# Patient Record
Sex: FEMALE | Race: BLACK OR AFRICAN AMERICAN | NOT HISPANIC OR LATINO | Employment: UNEMPLOYED | URBAN - METROPOLITAN AREA
[De-identification: names, ages, dates, MRNs, and addresses within clinical notes are randomized per-mention and may not be internally consistent; named-entity substitution may affect disease eponyms.]

---

## 2018-01-05 ENCOUNTER — OFFICE VISIT (OUTPATIENT)
Dept: PRIMARY CARE CLINIC | Facility: CLINIC | Age: 33
End: 2018-01-05
Payer: MEDICAID

## 2018-01-05 VITALS
SYSTOLIC BLOOD PRESSURE: 120 MMHG | WEIGHT: 223 LBS | TEMPERATURE: 98 F | HEIGHT: 67 IN | HEART RATE: 62 BPM | BODY MASS INDEX: 35 KG/M2 | DIASTOLIC BLOOD PRESSURE: 77 MMHG | RESPIRATION RATE: 18 BRPM | OXYGEN SATURATION: 99 %

## 2018-01-05 DIAGNOSIS — J32.9 SINUSITIS, UNSPECIFIED CHRONICITY, UNSPECIFIED LOCATION: Primary | ICD-10-CM

## 2018-01-05 DIAGNOSIS — J40 BRONCHITIS: ICD-10-CM

## 2018-01-05 DIAGNOSIS — R51.9 NONINTRACTABLE HEADACHE, UNSPECIFIED CHRONICITY PATTERN, UNSPECIFIED HEADACHE TYPE: ICD-10-CM

## 2018-01-05 DIAGNOSIS — E66.9 OBESITY, UNSPECIFIED CLASSIFICATION, UNSPECIFIED OBESITY TYPE, UNSPECIFIED WHETHER SERIOUS COMORBIDITY PRESENT: ICD-10-CM

## 2018-01-05 PROCEDURE — 99202 OFFICE O/P NEW SF 15 MIN: CPT | Mod: S$PBB,,, | Performed by: FAMILY MEDICINE

## 2018-01-05 PROCEDURE — 99204 OFFICE O/P NEW MOD 45 MIN: CPT | Mod: PBBFAC,PN | Performed by: FAMILY MEDICINE

## 2018-01-05 PROCEDURE — 99999 PR PBB SHADOW E&M-NEW PATIENT-LVL IV: CPT | Mod: PBBFAC,,, | Performed by: FAMILY MEDICINE

## 2018-01-05 RX ORDER — METHYLPREDNISOLONE 4 MG/1
TABLET ORAL
Qty: 1 PACKAGE | Refills: 0 | Status: SHIPPED | OUTPATIENT
Start: 2018-01-05 | End: 2018-01-26

## 2018-01-05 RX ORDER — PROMETHAZINE HYDROCHLORIDE AND CODEINE PHOSPHATE 6.25; 1 MG/5ML; MG/5ML
5 SOLUTION ORAL EVERY 6 HOURS PRN
Qty: 180 ML | Refills: 0 | Status: SHIPPED | OUTPATIENT
Start: 2018-01-05 | End: 2018-05-21

## 2018-01-05 RX ORDER — AZITHROMYCIN 250 MG/1
TABLET, FILM COATED ORAL
Qty: 6 TABLET | Refills: 0 | Status: SHIPPED | OUTPATIENT
Start: 2018-01-05 | End: 2018-01-09

## 2018-01-05 NOTE — PROGRESS NOTES
Subjective:       Patient ID: Levi Treviño is a 32 y.o. female.    Chief Complaint: Migraine and Cough (Congestion)    HPI: 42-year-old black female in for cold-sick×3 days--- no fever, + stuffiness nose.  No sore throat.  Bad cough, + phlegm-green.  No pneumonia asthma TB no smoking  Headache in the frontal area--just since a cold    ROS:  Skin: no psoriasis, eczema, skin cancer  HEENT: + headache,no  ocular pain, blurred vision, diplopia, epistaxis, hoarseness change in voice, thyroid trouble  Lung: No pneumonia, asthma, Tb, wheezing, SOB,  Heart: No chest pain, ankle edema, palpitations, MI, william murmur, hypertension, hyperlipidemia  Abdomen: No nausea, vomiting, diarrhea, constipation, ulcers, hepatitis, gallbladder disease, melena, hematochezia, hematemesis  : no UTI, renal disease, stones  GYN no hysterectomy--just Birth control shot today   MS: no fractures, O/A, lupus, rheumatoid, gout  Neuro: No dizziness, LOC, seizures   No diabetes, no anemia, no anxiety, no depression     Objective:   Physical Exam:  General: Well nourished, well developed, no acute distress  Skin: No lesions  HEENT: Eyes PERRLA, EOM intact, nose clear discharge, throat + erythema 1/4, ears TM clear good light reflex no bulging retraction  NECK: Supple, no bruits, No JVD, no nodes  Lungs: Clear, no rales, rhonchi, wheezing + coarse cough  Heart: Regular rate and rhythm, no murmurs, gallops, or rubs  Abdomen: flat, bowel sounds positive, no tenderness, or organomegaly  MS: Range of motion and muscle strength intact  Neuro: Alert, CN intact, oriented X 3  Extremities: No cyanosis, clubbing, or edema         Assessment:       1. Sinusitis, unspecified chronicity, unspecified location    2. Bronchitis    3. Obesity, unspecified classification, unspecified obesity type, unspecified whether serious comorbidity present    4. Nonintractable headache, unspecified chronicity pattern, unspecified headache type        Plan:        Sinusitis, unspecified chronicity, unspecified location    Bronchitis    Obesity, unspecified classification, unspecified obesity type, unspecified whether serious comorbidity present  -     CBC auto differential; Future; Expected date: 01/05/2018  -     Comprehensive metabolic panel; Future; Expected date: 01/05/2018  -     Lipid panel; Future; Expected date: 01/05/2018  -     X-Ray Chest PA And Lateral; Future; Expected date: 01/05/2018  -     Urinalysis  -     EKG 12-lead; Future  -     TSH; Future; Expected date: 01/05/2018    Nonintractable headache, unspecified chronicity pattern, unspecified headache type    Other orders  -     azithromycin (Z-EMANI) 250 MG tablet; 2 tabs by mouth day 1, then 1 tab by mouth daily x 4 days  Dispense: 6 tablet; Refill: 0  -     promethazine-codeine 6.25-10 mg/5 ml (PHENERGAN WITH CODEINE) 6.25-10 mg/5 mL syrup; Take 5 mLs by mouth every 6 (six) hours as needed for Cough.  Dispense: 180 mL; Refill: 0  -     methylPREDNISolone (MEDROL DOSEPACK) 4 mg tablet; use as directed  Dispense: 1 Package; Refill: 0      routine labs placed in chart of patient ever desires to get a routine physical  If not better in 1 week can add Levaquin///

## 2018-01-13 PROBLEM — R51.9 NONINTRACTABLE HEADACHE: Status: ACTIVE | Noted: 2018-01-13

## 2018-01-13 PROBLEM — J40 BRONCHITIS: Status: ACTIVE | Noted: 2018-01-13

## 2018-05-21 ENCOUNTER — OFFICE VISIT (OUTPATIENT)
Dept: PRIMARY CARE CLINIC | Facility: CLINIC | Age: 33
End: 2018-05-21
Payer: MEDICAID

## 2018-05-21 VITALS
OXYGEN SATURATION: 100 % | WEIGHT: 227 LBS | HEIGHT: 67 IN | DIASTOLIC BLOOD PRESSURE: 69 MMHG | SYSTOLIC BLOOD PRESSURE: 102 MMHG | RESPIRATION RATE: 18 BRPM | HEART RATE: 68 BPM | BODY MASS INDEX: 35.63 KG/M2 | TEMPERATURE: 99 F

## 2018-05-21 DIAGNOSIS — J40 BRONCHITIS: Primary | ICD-10-CM

## 2018-05-21 DIAGNOSIS — E66.9 OBESITY, UNSPECIFIED CLASSIFICATION, UNSPECIFIED OBESITY TYPE, UNSPECIFIED WHETHER SERIOUS COMORBIDITY PRESENT: ICD-10-CM

## 2018-05-21 DIAGNOSIS — M62.9 MUSCULOSKELETAL DISORDER INVOLVING UPPER TRAPEZIUS MUSCLE: ICD-10-CM

## 2018-05-21 PROCEDURE — 99213 OFFICE O/P EST LOW 20 MIN: CPT | Mod: PBBFAC,PN | Performed by: FAMILY MEDICINE

## 2018-05-21 PROCEDURE — 99999 PR PBB SHADOW E&M-EST. PATIENT-LVL III: CPT | Mod: PBBFAC,,, | Performed by: FAMILY MEDICINE

## 2018-05-21 PROCEDURE — 99213 OFFICE O/P EST LOW 20 MIN: CPT | Mod: S$PBB,,, | Performed by: FAMILY MEDICINE

## 2018-05-21 RX ORDER — AZITHROMYCIN 250 MG/1
TABLET, FILM COATED ORAL
Qty: 6 TABLET | Refills: 0 | Status: SHIPPED | OUTPATIENT
Start: 2018-05-21 | End: 2018-05-25

## 2018-05-21 RX ORDER — IBUPROFEN 600 MG/1
600 TABLET ORAL 3 TIMES DAILY
Qty: 60 TABLET | Refills: 5 | Status: SHIPPED | OUTPATIENT
Start: 2018-05-21 | End: 2018-09-05

## 2018-05-21 RX ORDER — PROMETHAZINE HYDROCHLORIDE AND CODEINE PHOSPHATE 6.25; 1 MG/5ML; MG/5ML
5 SOLUTION ORAL EVERY 6 HOURS PRN
Qty: 180 ML | Refills: 0 | Status: SHIPPED | OUTPATIENT
Start: 2018-05-21 | End: 2018-09-05

## 2018-05-21 RX ORDER — CYCLOBENZAPRINE HCL 10 MG
10 TABLET ORAL 3 TIMES DAILY PRN
Qty: 30 TABLET | Refills: 5 | Status: SHIPPED | OUTPATIENT
Start: 2018-05-21 | End: 2018-05-31

## 2018-05-21 RX ORDER — METHYLPREDNISOLONE 4 MG/1
TABLET ORAL
Qty: 1 PACKAGE | Refills: 0 | Status: SHIPPED | OUTPATIENT
Start: 2018-05-21 | End: 2018-06-11

## 2018-05-21 NOTE — PROGRESS NOTES
Subjective:       Patient ID: Levi Treviño is a 33 y.o. female.    Chief Complaint: Neck Pain (puilled muscle )    HPI: 33-year-old female in for neck pain thinks may have pulled a muscle--started last week.  Patient was stretching or may have recurred while holding a baby--- pain in the right upper trapezius.  No prior neck or shoulder injury.  No lupus rheumatoid gout       At night when patient lays down has a cold and has some difficulty breathing--no fever, no runny nose        , no sore throat, + cough only at night    ROS:  Skin: no psoriasis, eczema, skin cancer  HEENT: No headache, ocular pain, blurred vision, diplopia, epistaxis, hoarseness change in voice, thyroid trouble  Lung: No pneumonia, asthma, Tb, wheezing, SOB, no smoking  Heart: No chest pain, ankle edema, palpitations, MI, william murmur, hypertension, hyperlipidemia  Abdomen: No nausea, vomiting, diarrhea, constipation, ulcers, hepatitis, gallbladder disease, melena, hematochezia, hematemesis  : no UTI, renal disease, stones  GYN last menstrual period 5 years ago--on birth control  MS: no fractures, O/A, lupus, rheumatoid, gout  Neuro: No dizziness, LOC, seizures   No diabetes, no anemia, no anxiety, no depression     Objective:   Physical Exam:  General: Well nourished, well developed, no acute distress + overweight   Skin: No lesions  HEENT: Eyes PERRLA, EOM intact, nose patent, throat non-erythematous ears TM clear  NECK: Supple, no bruits, No JVD, no nodes  Lungs: Clear, no rales, rhonchi, wheezing + coarse cough  Heart: Regular rate and rhythm, no murmurs, gallops, or rubs  Abdomen: flat, bowel sounds positive, no tenderness, or organomegaly  MS: Tenderness right upper trapezius pain with palpation pain with raising arms overhead able to go up to about 150° with abduction but unable to get completely overhead due to pain in the right upper trapezius anterior posterior flexion shoulder is intact full range of motion muscle strength  and neck some pain with lateral flexion and rotation neck to the left in the right upper trapezius muscle muscle strength in the hand arms are all intact  Neuro: Alert, CN intact, oriented X 3  Extremities: No cyanosis, clubbing, or edema         Assessment:       1. Bronchitis    2. Obesity, unspecified classification, unspecified obesity type, unspecified whether serious comorbidity present    3. Musculoskeletal disorder involving upper trapezius muscle        Plan:       Bronchitis    Obesity, unspecified classification, unspecified obesity type, unspecified whether serious comorbidity present    Musculoskeletal disorder involving upper trapezius muscle    Other orders  -     azithromycin (Z-EMANI) 250 MG tablet; 2 tabs by mouth day 1, then 1 tab by mouth daily x 4 days  Dispense: 6 tablet; Refill: 0  -     promethazine-codeine 6.25-10 mg/5 ml (PHENERGAN WITH CODEINE) 6.25-10 mg/5 mL syrup; Take 5 mLs by mouth every 6 (six) hours as needed for Cough.  Dispense: 180 mL; Refill: 0  -     methylPREDNISolone (MEDROL DOSEPACK) 4 mg tablet; use as directed  Dispense: 1 Package; Refill: 0  -     ibuprofen (ADVIL,MOTRIN) 600 MG tablet; Take 1 tablet (600 mg total) by mouth 3 (three) times daily.  Dispense: 60 tablet; Refill: 5  -     cyclobenzaprine (FLEXERIL) 10 MG tablet; Take 1 tablet (10 mg total) by mouth 3 (three) times daily as needed.  Dispense: 30 tablet; Refill: 5      moist heat/Theragesic/range of motion exercise  For cold Zithromax/Medrol/Phenergan With Codeine coughing spells especially at night and occasional wheeze  For right shoulder pain after Medrol complete start ibuprofen 600 every 6-8 hours Flexeril 1 by mouth daily at bedtime  If not better in 6 weeks do x-ray of the cervical spine and right shoulder  Patient needs to do physical once off of steroids for 2 weeks as in computer CBC CMP lipid T4 TSH UA chest x-ray EKG is of physical

## 2018-09-05 ENCOUNTER — OFFICE VISIT (OUTPATIENT)
Dept: PRIMARY CARE CLINIC | Facility: CLINIC | Age: 33
End: 2018-09-05
Payer: MEDICAID

## 2018-09-05 VITALS
BODY MASS INDEX: 35.94 KG/M2 | HEIGHT: 67 IN | DIASTOLIC BLOOD PRESSURE: 65 MMHG | WEIGHT: 229 LBS | TEMPERATURE: 98 F | HEART RATE: 74 BPM | SYSTOLIC BLOOD PRESSURE: 99 MMHG | RESPIRATION RATE: 18 BRPM | OXYGEN SATURATION: 100 %

## 2018-09-05 DIAGNOSIS — K02.9 DENTAL CARIES: Primary | ICD-10-CM

## 2018-09-05 DIAGNOSIS — E66.9 OBESITY, UNSPECIFIED CLASSIFICATION, UNSPECIFIED OBESITY TYPE, UNSPECIFIED WHETHER SERIOUS COMORBIDITY PRESENT: ICD-10-CM

## 2018-09-05 DIAGNOSIS — J32.9 SINUSITIS, UNSPECIFIED CHRONICITY, UNSPECIFIED LOCATION: ICD-10-CM

## 2018-09-05 DIAGNOSIS — J40 BRONCHITIS: ICD-10-CM

## 2018-09-05 PROCEDURE — 99213 OFFICE O/P EST LOW 20 MIN: CPT | Mod: PBBFAC,PN | Performed by: FAMILY MEDICINE

## 2018-09-05 PROCEDURE — 99213 OFFICE O/P EST LOW 20 MIN: CPT | Mod: S$PBB,,, | Performed by: FAMILY MEDICINE

## 2018-09-05 PROCEDURE — 99999 PR PBB SHADOW E&M-EST. PATIENT-LVL III: CPT | Mod: PBBFAC,,, | Performed by: FAMILY MEDICINE

## 2018-09-05 RX ORDER — METHYLPREDNISOLONE 4 MG/1
TABLET ORAL
Qty: 1 PACKAGE | Refills: 0 | Status: SHIPPED | OUTPATIENT
Start: 2018-09-05 | End: 2018-11-01

## 2018-09-05 RX ORDER — PROMETHAZINE HYDROCHLORIDE AND CODEINE PHOSPHATE 6.25; 1 MG/5ML; MG/5ML
5 SOLUTION ORAL EVERY 6 HOURS PRN
Qty: 180 ML | Refills: 0 | Status: SHIPPED | OUTPATIENT
Start: 2018-09-05 | End: 2018-11-01

## 2018-09-05 RX ORDER — AMOXICILLIN AND CLAVULANATE POTASSIUM 875; 125 MG/1; MG/1
1 TABLET, FILM COATED ORAL EVERY 12 HOURS
Qty: 20 TABLET | Refills: 0 | Status: SHIPPED | OUTPATIENT
Start: 2018-09-05 | End: 2018-09-15

## 2018-09-05 NOTE — PROGRESS NOTES
Subjective:       Patient ID: Levi Treviño is a 33 y.o. female.    Chief Complaint: Otalgia (sore throat )    HPI: 33-year-old female in for ear pain especially the right-- suffering since last Friday-- 5 days-- no fever, +runny stuffy nose, +sore throat, +cough, +phlegm-- green.  No pneumonia asthma TB. No smoke    ROS:  Skin: no psoriasis, eczema, skin cancer  HEENT: No headache, ocular pain, blurred vision, diplopia, epistaxis, hoarseness change in voice, thyroid trouble  Lung: No pneumonia, asthma, Tb, wheezing, SOB, no smoking  Heart: No chest pain, ankle edema, palpitations, MI, william murmur, hypertension, hyperlipidemia  Abdomen: No nausea, vomiting, diarrhea, constipation, ulcers, hepatitis, gallbladder disease, melena, hematochezia, hematemesis  : no UTI, renal disease, stones  Gyn LMP  On Depo Provera   MS: no fractures, O/A, lupus, rheumatoid, gout  Neuro: No dizziness, LOC, seizures    No diabetes, no anemia, no anxiety, no depression   single, 5 children, unemployed,  Lives with 4 children.     Objective:   Physical Exam:  General: Well nourished, well developed, no acute distress  +overweight  Skin: No lesions  HEENT: Eyes PERRLA, EOM intact, nose  Clear discharge, throat +erythematous  1/4 ears TMs clear several severe dental caries  NECK: Supple, no bruits, No JVD, no nodes  Lungs: Clear, no rales, rhonchi, wheezing +coarse cough  Heart: Regular rate and rhythm, no murmurs, gallops, or rubs  Abdomen: flat, bowel sounds positive, no tenderness, or organomegaly  MS: Range of motion and muscle strength intact  Neuro: Alert, CN intact, oriented X 3  Extremities: No cyanosis, clubbing, or edema         Assessment:       No diagnosis found.    Plan:       There are no diagnoses linked to this encounter.

## 2018-12-11 ENCOUNTER — TELEPHONE (OUTPATIENT)
Dept: PRIMARY CARE CLINIC | Facility: CLINIC | Age: 33
End: 2018-12-11

## 2018-12-11 NOTE — TELEPHONE ENCOUNTER
----- Message from Saray Jorgensen sent at 12/11/2018  9:08 AM CST -----   Type:  Same Day Appointment Request    Caller is requesting a same day appointment.  Caller declined first available appointment listed below.      Name of Caller: pt  When is the first available appointment? tomorrow  Symptoms:   headache   cold  Best Call Back Number:   [phone]  Additional Information:     Wants to  Be  Seen  Today

## 2018-12-12 ENCOUNTER — OFFICE VISIT (OUTPATIENT)
Dept: PRIMARY CARE CLINIC | Facility: CLINIC | Age: 33
End: 2018-12-12
Payer: MEDICAID

## 2018-12-12 VITALS
WEIGHT: 229 LBS | TEMPERATURE: 98 F | BODY MASS INDEX: 34.71 KG/M2 | RESPIRATION RATE: 18 BRPM | OXYGEN SATURATION: 99 % | HEART RATE: 76 BPM | DIASTOLIC BLOOD PRESSURE: 73 MMHG | HEIGHT: 68 IN | SYSTOLIC BLOOD PRESSURE: 117 MMHG

## 2018-12-12 DIAGNOSIS — J32.9 SINUSITIS, UNSPECIFIED CHRONICITY, UNSPECIFIED LOCATION: Primary | ICD-10-CM

## 2018-12-12 DIAGNOSIS — K02.9 DENTAL CARIES: ICD-10-CM

## 2018-12-12 DIAGNOSIS — E66.9 OBESITY, UNSPECIFIED CLASSIFICATION, UNSPECIFIED OBESITY TYPE, UNSPECIFIED WHETHER SERIOUS COMORBIDITY PRESENT: ICD-10-CM

## 2018-12-12 DIAGNOSIS — J40 BRONCHITIS: ICD-10-CM

## 2018-12-12 DIAGNOSIS — R51.9 NONINTRACTABLE HEADACHE, UNSPECIFIED CHRONICITY PATTERN, UNSPECIFIED HEADACHE TYPE: ICD-10-CM

## 2018-12-12 PROCEDURE — 99214 OFFICE O/P EST MOD 30 MIN: CPT | Mod: PBBFAC,PN | Performed by: FAMILY MEDICINE

## 2018-12-12 PROCEDURE — 99999 PR PBB SHADOW E&M-EST. PATIENT-LVL IV: CPT | Mod: PBBFAC,,, | Performed by: FAMILY MEDICINE

## 2018-12-12 PROCEDURE — 96372 THER/PROPH/DIAG INJ SC/IM: CPT | Mod: PBBFAC,PN

## 2018-12-12 PROCEDURE — 99214 OFFICE O/P EST MOD 30 MIN: CPT | Mod: S$PBB,,, | Performed by: FAMILY MEDICINE

## 2018-12-12 RX ORDER — BUTALBITAL, ACETAMINOPHEN AND CAFFEINE 50; 325; 40 MG/1; MG/1; MG/1
TABLET ORAL
Qty: 30 TABLET | Refills: 1 | Status: SHIPPED | OUTPATIENT
Start: 2018-12-12 | End: 2019-02-22 | Stop reason: ALTCHOICE

## 2018-12-12 RX ORDER — METHYLPREDNISOLONE 4 MG/1
TABLET ORAL
Qty: 1 PACKAGE | Refills: 0 | Status: SHIPPED | OUTPATIENT
Start: 2018-12-12 | End: 2019-02-22 | Stop reason: ALTCHOICE

## 2018-12-12 RX ORDER — PROMETHAZINE HYDROCHLORIDE AND CODEINE PHOSPHATE 6.25; 1 MG/5ML; MG/5ML
5 SOLUTION ORAL EVERY 6 HOURS PRN
Qty: 180 ML | Refills: 0 | Status: SHIPPED | OUTPATIENT
Start: 2018-12-12 | End: 2019-02-22 | Stop reason: ALTCHOICE

## 2018-12-12 RX ORDER — BETAMETHASONE SODIUM PHOSPHATE AND BETAMETHASONE ACETATE 3; 3 MG/ML; MG/ML
12 INJECTION, SUSPENSION INTRA-ARTICULAR; INTRALESIONAL; INTRAMUSCULAR; SOFT TISSUE
Status: COMPLETED | OUTPATIENT
Start: 2018-12-12 | End: 2018-12-12

## 2018-12-12 RX ORDER — IBUPROFEN 600 MG/1
600 TABLET ORAL EVERY 6 HOURS PRN
Qty: 100 TABLET | Refills: 5 | Status: SHIPPED | OUTPATIENT
Start: 2018-12-12 | End: 2019-07-23

## 2018-12-12 RX ORDER — AZITHROMYCIN 250 MG/1
TABLET, FILM COATED ORAL
Qty: 6 TABLET | Refills: 0 | Status: SHIPPED | OUTPATIENT
Start: 2018-12-12 | End: 2018-12-16

## 2018-12-12 RX ADMIN — BETAMETHASONE SODIUM PHOSPHATE AND BETAMETHASONE ACETATE 12 MG: 3; 3 INJECTION, SUSPENSION INTRA-ARTICULAR; INTRALESIONAL; INTRAMUSCULAR at 03:12

## 2018-12-12 NOTE — PROGRESS NOTES
Subjective:       Patient ID: Levi Treviño is a 33 y.o. female.    Chief Complaint: Cough (congestion) and Generalized Body Aches    HPI: 33-year-old female --sick time 2-3 days--children are sick--no fever, +runny nose, no sore throat, +cough-bad, +phlegm-cream.  No pneumonia asthma TB.  No smokin body aches, headache       Patient states son and patient gets migraine headache--time 1 year--headache in the temples bilaterally--frequency 4 times per month--quality--states just hurting in the temple area--severity 8/10--duration 1-2 days.  No blurred vision pain eyeballs, double vision  nose bleeds dizziness passing out or seizures    ROS:  Skin: no psoriasis, eczema, skin cancer  HEENT: No headache, ocular pain, blurred vision, diplopia, epistaxis, hoarseness change in voice, thyroid trouble  Lung: No pneumonia, asthma, Tb, wheezing, SOB, no smoking  Heart: No chest pain, ankle edema, palpitations, MI, william murmur, hypertension, hyperlipidemia  Abdomen: No nausea, vomiting, diarrhea, constipation, ulcers, hepatitis, gallbladder disease, melena, hematochezia, hematemesis  : no UTI, renal disease, stones  Gyn LMP  On Depo Provera   MS: no fractures, O/A, lupus, rheumatoid, gout  Neuro: No dizziness, LOC, seizures    No diabetes, no anemia, no anxiety, + depression- since Friday --no help with daughter on the soccer team.  No transportation for her so she was all upset   single, 5 children, unemployed,  Lives with 4 children.     Objective:   Physical Exam:  General: Well nourished, well developed, no acute distress  +overweight  Skin: No lesions  HEENT: Eyes PERRLA, EOM intact, nose  Clear discharge, throat +erythematous  1/4 ears TMs clear several severe dental caries  NECK: Supple, no bruits, No JVD, no nodes  Lungs: Clear, no rales, rhonchi, wheezing +coarse cough  Heart: Regular rate and rhythm, no murmurs, gallops, or rubs  Abdomen: flat, bowel sounds positive, no tenderness, or organomegaly  MS: Range  of motion and muscle strength intact reflexes +1/4   Neuro: Alert, CN intact, oriented X 3 Romberg negative heel-toe intact  Extremities: No cyanosis, clubbing, or edema         Assessment:       1. Sinusitis, unspecified chronicity, unspecified location    2. Bronchitis    3. Obesity, unspecified classification, unspecified obesity type, unspecified whether serious comorbidity present    4. Dental caries    5. Nonintractable headache, unspecified chronicity pattern, unspecified headache type        Plan:       Sinusitis, unspecified chronicity, unspecified location    Bronchitis    Obesity, unspecified classification, unspecified obesity type, unspecified whether serious comorbidity present    Dental caries    Nonintractable headache, unspecified chronicity pattern, unspecified headache type  -     CBC auto differential; Future; Expected date: 12/12/2018  -     Comprehensive metabolic panel; Future; Expected date: 12/12/2018  -     EKG 12-lead; Future  -     Fecal Immunochemical Test (iFOBT); Future; Expected date: 12/12/2018  -     Hemoglobin A1c; Future; Expected date: 12/12/2018  -     Lipid panel; Future; Expected date: 12/12/2018  -     X-Ray Chest PA And Lateral; Future; Expected date: 12/12/2018  -     POCT urine dipstick without microscope  -     T4, free; Future; Expected date: 12/12/2018  -     TSH; Future; Expected date: 12/12/2018  -     Ambulatory Referral to Ophthalmology    Other orders  -     betamethasone acetate-betamethasone sodium phosphate injection 12 mg  -     azithromycin (Z-EMANI) 250 MG tablet; 2 tabs by mouth day 1, then 1 tab by mouth daily x 4 days  Dispense: 6 tablet; Refill: 0  -     promethazine-codeine 6.25-10 mg/5 ml (PHENERGAN WITH CODEINE) 6.25-10 mg/5 mL syrup; Take 5 mLs by mouth every 6 (six) hours as needed for Cough.  Dispense: 180 mL; Refill: 0  -     methylPREDNISolone (MEDROL DOSEPACK) 4 mg tablet; use as directed  Dispense: 1 Package; Refill: 0  -      butalbital-acetaminophen-caffeine -40 mg (FIORICET, ESGIC) -40 mg per tablet; One p.o. q.d. p.r.n. headache try only after trying ibuprofen 600 first  Dispense: 30 tablet; Refill: 1  -     ibuprofen (ADVIL,MOTRIN) 600 MG tablet; Take 1 tablet (600 mg total) by mouth every 6 (six) hours as needed.  Dispense: 100 tablet; Refill: 5      Zithromax/Medrol/Phenergan with codeine/Celestone --for cold  Needs routine lab CBCs CMP lipid T4 TSH UA stool guaiac chest x-ray EKG--if all negative do sed rate CARLITA rheumatoid factor RPR TB skin tests HIV for completeness for headache workup  Needs to go to the eye doctor get vision checked due to headaches  Fioricet for headaches after trying ibuprofen 600 mg q.6 hours  If above tests negative will consider MRI of the brain without gadolinium and possible Neuro consult also may consider Imitrex Inderal

## 2018-12-12 NOTE — PROGRESS NOTES
Patient ID verified by name and . NKDA. Celestone 12 mg IM injection given in right ventrogluteal using aseptic technique. Aspirated with no blood noted. Patient tolerated well. Given per physicians order. No adverse reaction noted.

## 2019-02-22 ENCOUNTER — OFFICE VISIT (OUTPATIENT)
Dept: PRIMARY CARE CLINIC | Facility: CLINIC | Age: 34
End: 2019-02-22
Payer: MEDICAID

## 2019-02-22 VITALS
BODY MASS INDEX: 34.83 KG/M2 | TEMPERATURE: 99 F | RESPIRATION RATE: 18 BRPM | DIASTOLIC BLOOD PRESSURE: 86 MMHG | WEIGHT: 229.81 LBS | HEART RATE: 80 BPM | HEIGHT: 68 IN | OXYGEN SATURATION: 96 % | SYSTOLIC BLOOD PRESSURE: 119 MMHG

## 2019-02-22 DIAGNOSIS — J40 BRONCHITIS: Primary | ICD-10-CM

## 2019-02-22 PROCEDURE — 99214 PR OFFICE/OUTPT VISIT, EST, LEVL IV, 30-39 MIN: ICD-10-PCS | Mod: S$PBB,,, | Performed by: NURSE PRACTITIONER

## 2019-02-22 PROCEDURE — 99999 PR PBB SHADOW E&M-EST. PATIENT-LVL IV: CPT | Mod: PBBFAC,,, | Performed by: NURSE PRACTITIONER

## 2019-02-22 PROCEDURE — 99214 OFFICE O/P EST MOD 30 MIN: CPT | Mod: PBBFAC,PN | Performed by: NURSE PRACTITIONER

## 2019-02-22 PROCEDURE — 99999 PR PBB SHADOW E&M-EST. PATIENT-LVL IV: ICD-10-PCS | Mod: PBBFAC,,, | Performed by: NURSE PRACTITIONER

## 2019-02-22 PROCEDURE — 99214 OFFICE O/P EST MOD 30 MIN: CPT | Mod: S$PBB,,, | Performed by: NURSE PRACTITIONER

## 2019-02-22 RX ORDER — AZITHROMYCIN 250 MG/1
TABLET, FILM COATED ORAL
Qty: 6 TABLET | Refills: 0 | Status: SHIPPED | OUTPATIENT
Start: 2019-02-22 | End: 2019-02-22 | Stop reason: SDUPTHER

## 2019-02-22 RX ORDER — AZITHROMYCIN 250 MG/1
TABLET, FILM COATED ORAL
Qty: 6 TABLET | Refills: 0 | Status: SHIPPED | OUTPATIENT
Start: 2019-02-22 | End: 2019-02-27

## 2019-02-22 RX ORDER — PROMETHAZINE HYDROCHLORIDE AND CODEINE PHOSPHATE 6.25; 1 MG/5ML; MG/5ML
5 SOLUTION ORAL EVERY 6 HOURS PRN
Qty: 118 ML | Refills: 0 | Status: SHIPPED | OUTPATIENT
Start: 2019-02-22 | End: 2019-07-23

## 2019-02-22 NOTE — PROGRESS NOTES
Chief Complaint  Chief Complaint   Patient presents with    URI       HPI    Marlynclaudiophillip Treviño is a 33 y.o. female that presents for cough.    Reports the onset of symptoms approximately 7 days ago.  States that it started as an upper respiratory infection but has gradually progressed to a green, productive cough.  No associated wheezing or shortness of breath.  Patient is nonsmoker.  No fever or chills.  No sore throat.  No nausea vomiting diarrhea.  No history of asthma.  Patient states that she has taken some of her signs cough medicine that is helped is asking for refill at this time.      PAST MEDICAL HISTORY:  History reviewed. No pertinent past medical history.    PAST SURGICAL HISTORY:  History reviewed. No pertinent surgical history.    SOCIAL HISTORY:  Social History     Socioeconomic History    Marital status: Single     Spouse name: Not on file    Number of children: Not on file    Years of education: Not on file    Highest education level: Not on file   Social Needs    Financial resource strain: Not on file    Food insecurity - worry: Not on file    Food insecurity - inability: Not on file    Transportation needs - medical: Not on file    Transportation needs - non-medical: Not on file   Occupational History    Not on file   Tobacco Use    Smoking status: Never Smoker    Smokeless tobacco: Never Used   Substance and Sexual Activity    Alcohol use: No    Drug use: No    Sexual activity: Yes   Other Topics Concern    Not on file   Social History Narrative    Not on file       FAMILY HISTORY:  History reviewed. No pertinent family history.    ALLERGIES AND MEDICATIONS: updated and reviewed.  Review of patient's allergies indicates:  No Known Allergies  Current Outpatient Medications   Medication Sig Dispense Refill    estradiol cypionate (DEPO-ESTRADIOL) 5 mg/mL injection Inject into the muscle every 28 days.      ibuprofen (ADVIL,MOTRIN) 600 MG tablet Take 1 tablet (600 mg total) by  "mouth every 6 (six) hours as needed. 100 tablet 5    azithromycin (Z-EMANI) 250 MG tablet Take 2 tablets by mouth on day 1; Take 1 tablet by mouth on days 2-5 6 tablet 0    promethazine-codeine 6.25-10 mg/5 ml (PHENERGAN WITH CODEINE) 6.25-10 mg/5 mL syrup Take 5 mLs by mouth every 6 (six) hours as needed for Cough. 118 mL 0     No current facility-administered medications for this visit.          ROS  Review of Systems   Constitutional: Positive for fatigue. Negative for chills and fever.   HENT: Negative for ear pain, postnasal drip, sinus pain and sore throat.    Respiratory: Positive for cough. Negative for shortness of breath.    Cardiovascular: Negative for chest pain.   Gastrointestinal: Negative for diarrhea, nausea and vomiting.   Psychiatric/Behavioral: Positive for sleep disturbance.           PHYSICAL EXAM  Vitals:    02/22/19 1420   BP: 119/86   BP Location: Right arm   Patient Position: Sitting   BP Method: Large (Automatic)   Pulse: 80   Resp: 18   Temp: 98.6 °F (37 °C)   TempSrc: Oral   SpO2: 96%   Weight: 104.2 kg (229 lb 12.8 oz)   Height: 5' 8" (1.727 m)    Body mass index is 34.94 kg/m².  Weight: 104.2 kg (229 lb 12.8 oz)   Height: 5' 8" (172.7 cm)     Physical Exam   Constitutional: She is oriented to person, place, and time. She appears well-developed and well-nourished.   HENT:   Head: Normocephalic.   Right Ear: Tympanic membrane normal.   Left Ear: Tympanic membrane normal.   Mouth/Throat: Uvula is midline, oropharynx is clear and moist and mucous membranes are normal.   Eyes: Conjunctivae are normal.   Cardiovascular: Normal rate, regular rhythm, normal heart sounds and normal pulses.   No murmur heard.  Pulses:       Radial pulses are 2+ on the right side, and 2+ on the left side.   No LE swelling noted   Pulmonary/Chest: Effort normal. She has wheezes.   Abdominal: Soft. Bowel sounds are normal. There is no tenderness.   Musculoskeletal: She exhibits no edema.   Lymphadenopathy:     She " has no cervical adenopathy.   Neurological: She is alert and oriented to person, place, and time.   Skin: Skin is warm and dry. No rash noted.   Psychiatric: She has a normal mood and affect.         Health Maintenance       Date Due Completion Date    Lipid Panel 1983 ---    TETANUS VACCINE 03/08/2001 ---    Pap Smear with HPV Cotest 03/08/2004 ---            Assessment & Plan    Problem List Items Addressed This Visit        Unprioritized    Bronchitis - Primary    Relevant Medications    promethazine-codeine 6.25-10 mg/5 ml (PHENERGAN WITH CODEINE) 6.25-10 mg/5 mL syrup    azithromycin (Z-EMANI) 250 MG tablet          Follow-up: Follow-up if symptoms worsen or fail to improve.    Joanna Lilly    Medication List with Changes/Refills   New Medications    AZITHROMYCIN (Z-EMANI) 250 MG TABLET    Take 2 tablets by mouth on day 1; Take 1 tablet by mouth on days 2-5   Current Medications    ESTRADIOL CYPIONATE (DEPO-ESTRADIOL) 5 MG/ML INJECTION    Inject into the muscle every 28 days.    IBUPROFEN (ADVIL,MOTRIN) 600 MG TABLET    Take 1 tablet (600 mg total) by mouth every 6 (six) hours as needed.   Changed and/or Refilled Medications    Modified Medication Previous Medication    PROMETHAZINE-CODEINE 6.25-10 MG/5 ML (PHENERGAN WITH CODEINE) 6.25-10 MG/5 ML SYRUP promethazine-codeine 6.25-10 mg/5 ml (PHENERGAN WITH CODEINE) 6.25-10 mg/5 mL syrup       Take 5 mLs by mouth every 6 (six) hours as needed for Cough.    Take 5 mLs by mouth every 6 (six) hours as needed for Cough.   Discontinued Medications    BUTALBITAL-ACETAMINOPHEN-CAFFEINE -40 MG (FIORICET, ESGIC) -40 MG PER TABLET    One p.o. q.d. p.r.n. headache try only after trying ibuprofen 600 first    METHYLPREDNISOLONE (MEDROL DOSEPACK) 4 MG TABLET    use as directed

## 2019-07-22 ENCOUNTER — TELEPHONE (OUTPATIENT)
Dept: PRIMARY CARE CLINIC | Facility: CLINIC | Age: 34
End: 2019-07-22

## 2019-07-22 NOTE — TELEPHONE ENCOUNTER
----- Message from Mariama Carlson sent at 7/22/2019 10:38 AM CDT -----  Contact: pt [phone]  Patient called and asked if you will be able to see her today for a headache and cold. No appt were available in the system.

## 2019-07-23 ENCOUNTER — OFFICE VISIT (OUTPATIENT)
Dept: PRIMARY CARE CLINIC | Facility: CLINIC | Age: 34
End: 2019-07-23
Payer: MEDICAID

## 2019-07-23 ENCOUNTER — TELEPHONE (OUTPATIENT)
Dept: PRIMARY CARE CLINIC | Facility: CLINIC | Age: 34
End: 2019-07-23

## 2019-07-23 VITALS
HEIGHT: 68 IN | DIASTOLIC BLOOD PRESSURE: 73 MMHG | RESPIRATION RATE: 19 BRPM | SYSTOLIC BLOOD PRESSURE: 119 MMHG | WEIGHT: 214 LBS | HEART RATE: 83 BPM | BODY MASS INDEX: 32.43 KG/M2 | OXYGEN SATURATION: 99 % | TEMPERATURE: 98 F

## 2019-07-23 DIAGNOSIS — E66.9 OBESITY, UNSPECIFIED CLASSIFICATION, UNSPECIFIED OBESITY TYPE, UNSPECIFIED WHETHER SERIOUS COMORBIDITY PRESENT: ICD-10-CM

## 2019-07-23 DIAGNOSIS — J40 BRONCHITIS: ICD-10-CM

## 2019-07-23 DIAGNOSIS — J01.00 ACUTE NON-RECURRENT MAXILLARY SINUSITIS: Primary | ICD-10-CM

## 2019-07-23 DIAGNOSIS — K02.9 DENTAL CARIES: ICD-10-CM

## 2019-07-23 PROCEDURE — 99999 PR PBB SHADOW E&M-EST. PATIENT-LVL III: CPT | Mod: PBBFAC,,, | Performed by: FAMILY MEDICINE

## 2019-07-23 PROCEDURE — 99213 OFFICE O/P EST LOW 20 MIN: CPT | Mod: S$PBB,,, | Performed by: FAMILY MEDICINE

## 2019-07-23 PROCEDURE — 99213 PR OFFICE/OUTPT VISIT, EST, LEVL III, 20-29 MIN: ICD-10-PCS | Mod: S$PBB,,, | Performed by: FAMILY MEDICINE

## 2019-07-23 PROCEDURE — 99999 PR PBB SHADOW E&M-EST. PATIENT-LVL III: ICD-10-PCS | Mod: PBBFAC,,, | Performed by: FAMILY MEDICINE

## 2019-07-23 PROCEDURE — 99213 OFFICE O/P EST LOW 20 MIN: CPT | Mod: PBBFAC,PN | Performed by: FAMILY MEDICINE

## 2019-07-23 RX ORDER — AZITHROMYCIN 250 MG/1
TABLET, FILM COATED ORAL
Qty: 6 TABLET | Refills: 0 | Status: SHIPPED | OUTPATIENT
Start: 2019-07-23 | End: 2019-07-27

## 2019-07-23 RX ORDER — METHYLPREDNISOLONE 4 MG/1
TABLET ORAL
Qty: 1 PACKAGE | Refills: 0 | Status: SHIPPED | OUTPATIENT
Start: 2019-07-23 | End: 2020-06-08 | Stop reason: CLARIF

## 2019-07-23 RX ORDER — PROMETHAZINE HYDROCHLORIDE AND CODEINE PHOSPHATE 6.25; 1 MG/5ML; MG/5ML
5 SOLUTION ORAL EVERY 6 HOURS PRN
Qty: 180 ML | Refills: 0 | Status: SHIPPED | OUTPATIENT
Start: 2019-07-23 | End: 2019-10-07 | Stop reason: SDUPTHER

## 2019-07-23 NOTE — TELEPHONE ENCOUNTER
Spoke with patient, appointment scheduled for today with PCP at 1145. Patient verbalized understanding.

## 2019-07-23 NOTE — TELEPHONE ENCOUNTER
----- Message from Keerthi Wright sent at 7/23/2019  9:57 AM CDT -----  Type:  Same Day Appointment Request    Caller is requesting a same day appointment.      Name of Caller:  Kvng  When is the first available appointment?  Thursday, July 25th  Symptoms:  Cold symptoms and body aches  Best Call Back Number:  [phone]  Additional Information:

## 2019-07-23 NOTE — PROGRESS NOTES
Subjective:       Patient ID: Levi Treviño is a 34 y.o. female.    Chief Complaint: Cough (congestion) and Generalized Body Aches    HPI: 33 yo BF sick x 2-3 days--+fever off and on, +headache right temple, +runny nose, no sore throat, + cough really bad at night--+phlegm-green.  No pneumonia asthma TB no smoking.  Nausea vomiting diarrhea LMP 2015     ROS:  Skin: no psoriasis, eczema, skin cancer  HEENT: No headache, ocular pain, blurred vision, diplopia, epistaxis, hoarseness change in voice, thyroid trouble  Lung: No pneumonia, asthma, Tb, wheezing, SOB, no smoking  Heart: No chest pain, ankle edema, palpitations, MI, william murmur, hypertension, hyperlipidemia  Abdomen: No nausea, vomiting, diarrhea, constipation, ulcers, hepatitis, gallbladder disease, melena, hematochezia, hematemesis  : no UTI, renal disease, stones  Gyn LMP  On Depo Provera   MS: no fractures, O/A, lupus, rheumatoid, gout  Neuro: No dizziness, LOC, seizures    No diabetes, no anemia, no anxiety, no  depression   single, 5 children, unemployed,  Lives with 4 children.     Objective:   Physical Exam:  General: Well nourished, well developed, no acute distress  +overweight  Skin: No lesions  HEENT: Eyes PERRLA, EOM intact, nose  Clear discharge, throat +1/4 erythematous  1/4 ears TMs clear several severe dental caries  NECK: Supple, no bruits, No JVD, no nodes  Lungs: Clear, no rales, rhonchi, wheezing +coarse cough  Heart: Regular rate and rhythm, no murmurs, gallops, or rubs  Abdomen: flat, bowel sounds positive, no tenderness, or organomegaly  MS: Range of motion and muscle strength intact reflexes +1/4   Neuro: Alert, CN intact, oriented X 3 Romberg negative heel-toe intact  Extremities: No cyanosis, clubbing, or edema         Assessment:       1. Acute non-recurrent maxillary sinusitis    2. Bronchitis    3. Dental caries    4. Obesity, unspecified classification, unspecified obesity type, unspecified whether serious comorbidity  present        Plan:       Acute non-recurrent maxillary sinusitis    Bronchitis    Dental caries    Obesity, unspecified classification, unspecified obesity type, unspecified whether serious comorbidity present    Other orders  -     azithromycin (Z-EMANI) 250 MG tablet; 2 tabs by mouth day 1, then 1 tab by mouth daily x 4 days  Dispense: 6 tablet; Refill: 0  -     promethazine-codeine 6.25-10 mg/5 ml (PHENERGAN WITH CODEINE) 6.25-10 mg/5 mL syrup; Take 5 mLs by mouth every 6 (six) hours as needed for Cough.  Dispense: 180 mL; Refill: 0  -     methylPREDNISolone (MEDROL DOSEPACK) 4 mg tablet; use as directed  Dispense: 1 Package; Refill: 0      Zithromax/Medrol/Phenergan with codeine/Celestone --for cold  Needs routine lab CBCs CMP lipid T4 TSH UA stool guaiac chest x-ray EKG--if all negative do sed rate CARLITA rheumatoid factor RPR TB skin tests HIV for completeness for headache workup  Needs to go to the eye doctor get vision checked due to headaches  Fioricet for headaches after trying ibuprofen 600 mg q.6 hours  If above tests negative will consider MRI of the brain without gadolinium and possible Neuro consult also may consider Imitrex Inderal

## 2019-10-07 ENCOUNTER — OFFICE VISIT (OUTPATIENT)
Dept: PRIMARY CARE CLINIC | Facility: CLINIC | Age: 34
End: 2019-10-07
Payer: MEDICAID

## 2019-10-07 VITALS
SYSTOLIC BLOOD PRESSURE: 132 MMHG | TEMPERATURE: 98 F | DIASTOLIC BLOOD PRESSURE: 82 MMHG | WEIGHT: 214 LBS | HEIGHT: 68 IN | BODY MASS INDEX: 32.43 KG/M2 | RESPIRATION RATE: 17 BRPM | OXYGEN SATURATION: 99 % | HEART RATE: 65 BPM

## 2019-10-07 DIAGNOSIS — J01.01 ACUTE RECURRENT MAXILLARY SINUSITIS: ICD-10-CM

## 2019-10-07 DIAGNOSIS — E66.9 OBESITY, UNSPECIFIED CLASSIFICATION, UNSPECIFIED OBESITY TYPE, UNSPECIFIED WHETHER SERIOUS COMORBIDITY PRESENT: ICD-10-CM

## 2019-10-07 DIAGNOSIS — J40 BRONCHITIS: Primary | ICD-10-CM

## 2019-10-07 PROCEDURE — 99213 PR OFFICE/OUTPT VISIT, EST, LEVL III, 20-29 MIN: ICD-10-PCS | Mod: S$PBB,,, | Performed by: FAMILY MEDICINE

## 2019-10-07 PROCEDURE — 99213 OFFICE O/P EST LOW 20 MIN: CPT | Mod: PBBFAC,PN | Performed by: FAMILY MEDICINE

## 2019-10-07 PROCEDURE — 99999 PR PBB SHADOW E&M-EST. PATIENT-LVL III: CPT | Mod: PBBFAC,,, | Performed by: FAMILY MEDICINE

## 2019-10-07 PROCEDURE — 99213 OFFICE O/P EST LOW 20 MIN: CPT | Mod: S$PBB,,, | Performed by: FAMILY MEDICINE

## 2019-10-07 PROCEDURE — 99999 PR PBB SHADOW E&M-EST. PATIENT-LVL III: ICD-10-PCS | Mod: PBBFAC,,, | Performed by: FAMILY MEDICINE

## 2019-10-07 RX ORDER — PROMETHAZINE HYDROCHLORIDE AND CODEINE PHOSPHATE 6.25; 1 MG/5ML; MG/5ML
5 SOLUTION ORAL EVERY 6 HOURS PRN
Qty: 180 ML | Refills: 0 | Status: SHIPPED | OUTPATIENT
Start: 2019-10-07 | End: 2020-06-08 | Stop reason: CLARIF

## 2019-10-07 RX ORDER — CEFDINIR 300 MG/1
300 CAPSULE ORAL 2 TIMES DAILY
Qty: 20 CAPSULE | Refills: 0 | Status: SHIPPED | OUTPATIENT
Start: 2019-10-07 | End: 2019-10-17

## 2019-10-07 NOTE — PROGRESS NOTES
Subjective:       Patient ID: Levi Treviño is a 34 y.o. female.    Chief Complaint: No chief complaint on file.    HPI: 35 yo BF recently seen in July for cold treated with Zithromax/Medrol/Phenergan with codeine--patient states has had cold symptoms now for about a week--no fever, has pressure in the frontal and maxillary area--runny/stuffy nose, no sore throat, +cough, +phlegm--green.  No pneumonia asthma TB no smoking    ROS:  Skin: no psoriasis, eczema, skin cancer  HEENT: No headache, ocular pain, blurred vision, diplopia, epistaxis, hoarseness change in voice, thyroid trouble  Lung: No pneumonia, asthma, Tb, wheezing, SOB, no smoking  Heart: No chest pain, ankle edema, palpitations, MI, william murmur, hypertension, hyperlipidemia  Abdomen: No nausea, vomiting, diarrhea, constipation, ulcers, hepatitis, gallbladder disease, melena, hematochezia, hematemesis  : no UTI, renal disease, stones  Gyn LMP  On Depo Provera  LMP 2013   MS: no fractures, O/A, lupus, rheumatoid, gout  Neuro: No dizziness, LOC, seizures    No diabetes, no anemia, no anxiety, no  depression   single, 5 children, unemployed,  Lives with 4 children.     Objective:   Physical Exam:  General: Well nourished, well developed, no acute distress  +overweight  Skin: No lesions  HEENT: Eyes PERRLA, EOM intact, nose  Clear discharge, throat +1/4 erythematous  1/4 ears TMs clear several severe dental caries  NECK: Supple, no bruits, No JVD, no nodes  Lungs: Clear, no rales, rhonchi, wheezing +coarse cough  Heart: Regular rate and rhythm, no murmurs, gallops, or rubs  Abdomen: flat, bowel sounds positive, no tenderness, or organomegaly  MS: Range of motion and muscle strength intact reflexes +1/4   Neuro: Alert, CN intact, oriented X 3 Romberg negative heel-toe intact  Extremities: No cyanosis, clubbing, or edema         Assessment:       1. Bronchitis    2. Acute recurrent maxillary sinusitis    3. Obesity, unspecified classification,  unspecified obesity type, unspecified whether serious comorbidity present        Plan:       Bronchitis    Acute recurrent maxillary sinusitis    Obesity, unspecified classification, unspecified obesity type, unspecified whether serious comorbidity present      patient recently treated July with Zithromax/Medrol/Phenergan with codeine---will treat with Omnicef 300 b.i.d. prednisone tapering dose/--Phenergan with codeine if symptoms recur may need x-rays of the sinus 2 weeks after off steroids Needs lab is below  Needs routine lab CBCs CMP lipid T4 TSH UA stool guaiac chest x-ray EKG--if all negative do sed rate CARLITA rheumatoid factor RPR TB skin tests HIV for completeness for headache workup

## 2020-06-09 ENCOUNTER — OFFICE VISIT (OUTPATIENT)
Dept: PRIMARY CARE CLINIC | Facility: CLINIC | Age: 35
End: 2020-06-09
Payer: MEDICAID

## 2020-06-09 VITALS
TEMPERATURE: 97 F | HEART RATE: 78 BPM | DIASTOLIC BLOOD PRESSURE: 68 MMHG | BODY MASS INDEX: 33.74 KG/M2 | SYSTOLIC BLOOD PRESSURE: 112 MMHG | OXYGEN SATURATION: 98 % | RESPIRATION RATE: 18 BRPM | HEIGHT: 67 IN | WEIGHT: 214.94 LBS

## 2020-06-09 DIAGNOSIS — L02.91 ABSCESS: Primary | ICD-10-CM

## 2020-06-09 PROBLEM — M62.9 MUSCULOSKELETAL DISORDER INVOLVING UPPER TRAPEZIUS MUSCLE: Status: RESOLVED | Noted: 2018-05-21 | Resolved: 2020-06-09

## 2020-06-09 PROBLEM — J01.01 ACUTE RECURRENT MAXILLARY SINUSITIS: Status: RESOLVED | Noted: 2019-07-23 | Resolved: 2020-06-09

## 2020-06-09 PROBLEM — J40 BRONCHITIS: Status: RESOLVED | Noted: 2018-01-13 | Resolved: 2020-06-09

## 2020-06-09 PROCEDURE — 99213 OFFICE O/P EST LOW 20 MIN: CPT | Mod: PBBFAC,PN | Performed by: NURSE PRACTITIONER

## 2020-06-09 PROCEDURE — 99213 OFFICE O/P EST LOW 20 MIN: CPT | Mod: S$PBB,,, | Performed by: NURSE PRACTITIONER

## 2020-06-09 PROCEDURE — 99213 PR OFFICE/OUTPT VISIT, EST, LEVL III, 20-29 MIN: ICD-10-PCS | Mod: S$PBB,,, | Performed by: NURSE PRACTITIONER

## 2020-06-09 PROCEDURE — 99999 PR PBB SHADOW E&M-EST. PATIENT-LVL III: ICD-10-PCS | Mod: PBBFAC,,, | Performed by: NURSE PRACTITIONER

## 2020-06-09 PROCEDURE — 99999 PR PBB SHADOW E&M-EST. PATIENT-LVL III: CPT | Mod: PBBFAC,,, | Performed by: NURSE PRACTITIONER

## 2020-06-09 RX ORDER — HYDROCODONE BITARTRATE AND ACETAMINOPHEN 5; 325 MG/1; MG/1
1 TABLET ORAL EVERY 6 HOURS PRN
Qty: 15 TABLET | Refills: 0 | Status: SHIPPED | OUTPATIENT
Start: 2020-06-09

## 2020-06-09 NOTE — PROGRESS NOTES
"Chief Complaint  Chief Complaint   Patient presents with    Follow-up     Abscess        HPI    Levi Treviño is a 35 y.o. female that presents for ER follow up.    Patient to the ER [facility] for lesion on her back. First noticed the lesion last week which has since gotten bigger since first noted. Present on her mid back described as painful. States that she thinks it was a "spider bite" but did not see an insect in the area. To the ER and completed an I and D with packing. Treated with etodolac and clindamycin which she hasn't filled at this point. No fever or chills. Area draining well. No fever or chills.           PAST MEDICAL HISTORY:  History reviewed. No pertinent past medical history.    PAST SURGICAL HISTORY:  History reviewed. No pertinent surgical history.    SOCIAL HISTORY:  Social History     Socioeconomic History    Marital status: Single     Spouse name: Not on file    Number of children: Not on file    Years of education: Not on file    Highest education level: Not on file   Occupational History    Not on file   Social Needs    Financial resource strain: Not on file    Food insecurity:     Worry: Not on file     Inability: Not on file    Transportation needs:     Medical: Not on file     Non-medical: Not on file   Tobacco Use    Smoking status: Never Smoker    Smokeless tobacco: Never Used   Substance and Sexual Activity    Alcohol use: No    Drug use: No    Sexual activity: Yes   Lifestyle    Physical activity:     Days per week: Not on file     Minutes per session: Not on file    Stress: Not on file   Relationships    Social connections:     Talks on phone: Not on file     Gets together: Not on file     Attends Confucianist service: Not on file     Active member of club or organization: Not on file     Attends meetings of clubs or organizations: Not on file     Relationship status: Not on file   Other Topics Concern    Not on file   Social History Narrative    Not on file " "      FAMILY HISTORY:  History reviewed. No pertinent family history.    ALLERGIES AND MEDICATIONS: updated and reviewed.  Review of patient's allergies indicates:  No Known Allergies  Current Outpatient Medications   Medication Sig Dispense Refill    estradiol cypionate (DEPO-ESTRADIOL) 5 mg/mL injection Inject into the muscle every 28 days.      clindamycin (CLEOCIN) 300 MG capsule Take 1 capsule (300 mg total) by mouth 4 (four) times daily. for 5 days (Patient not taking: Reported on 6/9/2020) 20 capsule 0    etodolac (LODINE) 200 MG Cap Take 1 capsule (200 mg total) by mouth 3 (three) times daily. (Patient not taking: Reported on 6/9/2020) 30 capsule 0    HYDROcodone-acetaminophen (NORCO) 5-325 mg per tablet Take 1 tablet by mouth every 6 (six) hours as needed for Pain. 15 tablet 0     No current facility-administered medications for this visit.          ROS  Review of Systems   Constitutional: Negative for chills and fever.   HENT: Negative for ear pain, postnasal drip and sinus pain.    Respiratory: Negative for cough and shortness of breath.    Cardiovascular: Negative for chest pain.   Gastrointestinal: Negative for diarrhea, nausea and vomiting.   Skin: Positive for wound (Abscess).           PHYSICAL EXAM  Vitals:    06/09/20 0831   BP: 112/68   BP Location: Left arm   Patient Position: Sitting   BP Method: Large (Manual)   Pulse: 78   Resp: 18   Temp: 97.3 °F (36.3 °C)   TempSrc: Oral   SpO2: 98%   Weight: 97.5 kg (214 lb 15.2 oz)   Height: 5' 7" (1.702 m)    Body mass index is 33.67 kg/m².  Weight: 97.5 kg (214 lb 15.2 oz)   Height: 5' 7" (170.2 cm)     Physical Exam   Constitutional: She is oriented to person, place, and time. She appears well-developed and well-nourished.   HENT:   Head: Normocephalic.   Right Ear: Tympanic membrane normal.   Left Ear: Tympanic membrane normal.   Mouth/Throat: Uvula is midline, oropharynx is clear and moist and mucous membranes are normal.   Eyes: Conjunctivae are " normal.   Cardiovascular: Normal rate, regular rhythm, normal heart sounds and normal pulses.   No murmur heard.  Pulses:       Radial pulses are 2+ on the right side, and 2+ on the left side.   No LE swelling noted   Pulmonary/Chest: Effort normal and breath sounds normal. She has no wheezes.   Abdominal: Soft. Bowel sounds are normal. There is no tenderness.   Musculoskeletal: She exhibits no edema.   Lymphadenopathy:     She has no cervical adenopathy.   Neurological: She is alert and oriented to person, place, and time.   Skin: Skin is warm and dry. Lesion (dressing changed.) noted. No rash noted.        Abscess located center back, packing noted.  Draining purulent drainage.  Tender to touch.  No surrounding erythema.   Psychiatric: She has a normal mood and affect.         Health Maintenance       Date Due Completion Date    Lipid Panel 1983 ---    HIV Screening 03/08/1998 ---    Influenza Vaccine (Season Ended) 09/01/2020 10/23/2018    Pap Smear with HPV Cotest 10/23/2021 10/23/2018 (Done)    Override on 10/23/2018: Done (PER CARE EVERYWHERE)    TETANUS VACCINE 04/05/2027 4/5/2017            Assessment & Plan    Problem List Items Addressed This Visit     None      Visit Diagnoses     Abscess    -  Primary    Relevant Medications    HYDROcodone-acetaminophen (NORCO) 5-325 mg per tablet  Start clindamycin and etodolac as directed by the ER.  Follow-up tomorrow or Thursday for packing removal.          Follow-up: Follow up in about 1 day (around 6/10/2020).    Joanna Lilly    Medication List with Changes/Refills   New Medications    HYDROCODONE-ACETAMINOPHEN (NORCO) 5-325 MG PER TABLET    Take 1 tablet by mouth every 6 (six) hours as needed for Pain.   Current Medications    CLINDAMYCIN (CLEOCIN) 300 MG CAPSULE    Take 1 capsule (300 mg total) by mouth 4 (four) times daily. for 5 days    ESTRADIOL CYPIONATE (DEPO-ESTRADIOL) 5 MG/ML INJECTION    Inject into the muscle every 28 days.    ETODOLAC (LODINE)  200 MG CAP    Take 1 capsule (200 mg total) by mouth 3 (three) times daily.

## 2020-06-10 ENCOUNTER — CLINICAL SUPPORT (OUTPATIENT)
Dept: PRIMARY CARE CLINIC | Facility: CLINIC | Age: 35
End: 2020-06-10
Payer: MEDICAID

## 2020-06-10 NOTE — PROGRESS NOTES
Packing removed from wound on upper back. No drainage noted. Joanna almanza checked wound. re bandaged with 2x2 gasue and a band aid to cover.

## 2024-08-28 DIAGNOSIS — Z12.31 ENCOUNTER FOR SCREENING MAMMOGRAM FOR MALIGNANT NEOPLASM OF BREAST: Primary | ICD-10-CM
